# Patient Record
Sex: MALE | Race: WHITE | Employment: OTHER | URBAN - METROPOLITAN AREA
[De-identification: names, ages, dates, MRNs, and addresses within clinical notes are randomized per-mention and may not be internally consistent; named-entity substitution may affect disease eponyms.]

---

## 2022-07-01 ENCOUNTER — HOSPITAL ENCOUNTER (OUTPATIENT)
Age: 67
Setting detail: OBSERVATION
Discharge: HOME | End: 2022-07-02
Attending: EMERGENCY MEDICINE | Admitting: HOSPITALIST
Payer: MEDICARE

## 2022-07-01 ENCOUNTER — APPOINTMENT (OUTPATIENT)
Dept: CT IMAGING | Age: 67
End: 2022-07-01
Attending: EMERGENCY MEDICINE
Payer: MEDICARE

## 2022-07-01 ENCOUNTER — APPOINTMENT (OUTPATIENT)
Dept: GENERAL RADIOLOGY | Age: 67
End: 2022-07-01
Attending: EMERGENCY MEDICINE
Payer: MEDICARE

## 2022-07-01 DIAGNOSIS — I63.9 CEREBROVASCULAR ACCIDENT (CVA), UNSPECIFIED MECHANISM (HCC): Primary | ICD-10-CM

## 2022-07-01 DIAGNOSIS — N18.9 CHRONIC KIDNEY DISEASE, UNSPECIFIED CKD STAGE: ICD-10-CM

## 2022-07-01 PROBLEM — G45.9 TIA (TRANSIENT ISCHEMIC ATTACK): Status: ACTIVE | Noted: 2022-07-01

## 2022-07-01 LAB
ABO + RH BLD: NORMAL
ALBUMIN SERPL-MCNC: 3.7 G/DL (ref 3.5–5)
ALBUMIN/GLOB SERPL: 1.1 {RATIO} (ref 1.1–2.2)
ALP SERPL-CCNC: 164 U/L (ref 45–117)
ALT SERPL-CCNC: 54 U/L (ref 12–78)
ANION GAP SERPL CALC-SCNC: 12 MMOL/L (ref 5–15)
APTT PPP: 28.7 SEC (ref 21.2–34.1)
AST SERPL W P-5'-P-CCNC: 24 U/L (ref 15–37)
BASOPHILS # BLD: 0.1 K/UL (ref 0–0.1)
BASOPHILS NFR BLD: 1 % (ref 0–1)
BILIRUB SERPL-MCNC: 1 MG/DL (ref 0.2–1)
BLOOD GROUP ANTIBODIES SERPL: NEGATIVE
BNP SERPL-MCNC: ABNORMAL PG/ML
BUN SERPL-MCNC: 58 MG/DL (ref 6–20)
BUN/CREAT SERPL: 13 (ref 12–20)
CA-I BLD-MCNC: 8.9 MG/DL (ref 8.5–10.1)
CHLORIDE SERPL-SCNC: 110 MMOL/L (ref 97–108)
CO2 SERPL-SCNC: 15 MMOL/L (ref 21–32)
CREAT SERPL-MCNC: 4.37 MG/DL (ref 0.7–1.3)
DIFFERENTIAL METHOD BLD: ABNORMAL
EOSINOPHIL # BLD: 0.3 K/UL (ref 0–0.4)
EOSINOPHIL NFR BLD: 3 % (ref 0–7)
ERYTHROCYTE [DISTWIDTH] IN BLOOD BY AUTOMATED COUNT: 16.2 % (ref 11.5–14.5)
GLOBULIN SER CALC-MCNC: 3.4 G/DL (ref 2–4)
GLUCOSE SERPL-MCNC: 169 MG/DL (ref 65–100)
HCT VFR BLD AUTO: 33.6 % (ref 36.6–50.3)
HGB BLD-MCNC: 11.1 G/DL (ref 12.1–17)
IMM GRANULOCYTES # BLD AUTO: 0 K/UL (ref 0–0.04)
IMM GRANULOCYTES NFR BLD AUTO: 0 % (ref 0–0.5)
INR PPP: 1.2 (ref 0.9–1.1)
LYMPHOCYTES # BLD: 1.6 K/UL (ref 0.8–3.5)
LYMPHOCYTES NFR BLD: 17 % (ref 12–49)
MCH RBC QN AUTO: 28 PG (ref 26–34)
MCHC RBC AUTO-ENTMCNC: 33 G/DL (ref 30–36.5)
MCV RBC AUTO: 84.8 FL (ref 80–99)
MONOCYTES # BLD: 1.1 K/UL (ref 0–1)
MONOCYTES NFR BLD: 11 % (ref 5–13)
NEUTS SEG # BLD: 6.3 K/UL (ref 1.8–8)
NEUTS SEG NFR BLD: 68 % (ref 32–75)
NRBC # BLD: 0 K/UL (ref 0–0.01)
NRBC BLD-RTO: 0 PER 100 WBC
PLATELET # BLD AUTO: 225 K/UL (ref 150–400)
PMV BLD AUTO: 11.1 FL (ref 8.9–12.9)
POTASSIUM SERPL-SCNC: 4.1 MMOL/L (ref 3.5–5.1)
PROT SERPL-MCNC: 7.1 G/DL (ref 6.4–8.2)
PROTHROMBIN TIME: 15.4 SEC (ref 11.9–14.6)
RBC # BLD AUTO: 3.96 M/UL (ref 4.1–5.7)
SODIUM SERPL-SCNC: 137 MMOL/L (ref 136–145)
SPECIMEN EXP DATE BLD: NORMAL
THERAPEUTIC RANGE,PTTT: NORMAL SEC (ref 82–109)
TROPONIN-HIGH SENSITIVITY: 103 NG/L (ref 0–76)
WBC # BLD AUTO: 9.5 K/UL (ref 4.1–11.1)

## 2022-07-01 PROCEDURE — 85730 THROMBOPLASTIN TIME PARTIAL: CPT

## 2022-07-01 PROCEDURE — G0378 HOSPITAL OBSERVATION PER HR: HCPCS

## 2022-07-01 PROCEDURE — 85610 PROTHROMBIN TIME: CPT

## 2022-07-01 PROCEDURE — 84484 ASSAY OF TROPONIN QUANT: CPT

## 2022-07-01 PROCEDURE — 80053 COMPREHEN METABOLIC PANEL: CPT

## 2022-07-01 PROCEDURE — 36415 COLL VENOUS BLD VENIPUNCTURE: CPT

## 2022-07-01 PROCEDURE — 71045 X-RAY EXAM CHEST 1 VIEW: CPT

## 2022-07-01 PROCEDURE — 74011250637 HC RX REV CODE- 250/637: Performed by: EMERGENCY MEDICINE

## 2022-07-01 PROCEDURE — 85025 COMPLETE CBC W/AUTO DIFF WBC: CPT

## 2022-07-01 PROCEDURE — 93005 ELECTROCARDIOGRAM TRACING: CPT

## 2022-07-01 PROCEDURE — 70496 CT ANGIOGRAPHY HEAD: CPT

## 2022-07-01 PROCEDURE — 83880 ASSAY OF NATRIURETIC PEPTIDE: CPT

## 2022-07-01 PROCEDURE — 74011250637 HC RX REV CODE- 250/637: Performed by: FAMILY MEDICINE

## 2022-07-01 PROCEDURE — 83970 ASSAY OF PARATHORMONE: CPT

## 2022-07-01 PROCEDURE — 86900 BLOOD TYPING SEROLOGIC ABO: CPT

## 2022-07-01 PROCEDURE — 74011250637 HC RX REV CODE- 250/637: Performed by: HOSPITALIST

## 2022-07-01 PROCEDURE — 74011250636 HC RX REV CODE- 250/636: Performed by: EMERGENCY MEDICINE

## 2022-07-01 PROCEDURE — 74011000636 HC RX REV CODE- 636: Performed by: EMERGENCY MEDICINE

## 2022-07-01 PROCEDURE — 99285 EMERGENCY DEPT VISIT HI MDM: CPT

## 2022-07-01 PROCEDURE — 70450 CT HEAD/BRAIN W/O DYE: CPT

## 2022-07-01 RX ORDER — HYDRALAZINE HYDROCHLORIDE 50 MG/1
50 TABLET, FILM COATED ORAL 3 TIMES DAILY
COMMUNITY

## 2022-07-01 RX ORDER — FLUTICASONE PROPIONATE 50 MCG
2 SPRAY, SUSPENSION (ML) NASAL
COMMUNITY
Start: 2022-04-03

## 2022-07-01 RX ORDER — ATORVASTATIN CALCIUM 10 MG/1
20 TABLET, FILM COATED ORAL
Status: DISCONTINUED | OUTPATIENT
Start: 2022-07-01 | End: 2022-07-02

## 2022-07-01 RX ORDER — ALLOPURINOL 100 MG/1
200 TABLET ORAL DAILY
Status: DISCONTINUED | OUTPATIENT
Start: 2022-07-01 | End: 2022-07-02 | Stop reason: HOSPADM

## 2022-07-01 RX ORDER — ASPIRIN 325 MG
325 TABLET ORAL
Status: COMPLETED | OUTPATIENT
Start: 2022-07-01 | End: 2022-07-01

## 2022-07-01 RX ORDER — SODIUM BICARBONATE 650 MG/1
650 TABLET ORAL 3 TIMES DAILY
Status: DISCONTINUED | OUTPATIENT
Start: 2022-07-01 | End: 2022-07-02 | Stop reason: HOSPADM

## 2022-07-01 RX ORDER — AMLODIPINE BESYLATE 5 MG/1
10 TABLET ORAL DAILY
Status: DISCONTINUED | OUTPATIENT
Start: 2022-07-02 | End: 2022-07-02 | Stop reason: HOSPADM

## 2022-07-01 RX ORDER — CALCITRIOL 0.25 UG/1
0.25 CAPSULE ORAL DAILY
Status: DISCONTINUED | OUTPATIENT
Start: 2022-07-02 | End: 2022-07-02 | Stop reason: HOSPADM

## 2022-07-01 RX ORDER — FLUTICASONE PROPIONATE 50 MCG
2 SPRAY, SUSPENSION (ML) NASAL DAILY
Status: DISCONTINUED | OUTPATIENT
Start: 2022-07-02 | End: 2022-07-02 | Stop reason: HOSPADM

## 2022-07-01 RX ORDER — AMLODIPINE BESYLATE 10 MG/1
5 TABLET ORAL DAILY
COMMUNITY
Start: 2022-04-22

## 2022-07-01 RX ORDER — ACETAMINOPHEN 650 MG/1
650 SUPPOSITORY RECTAL
Status: DISCONTINUED | OUTPATIENT
Start: 2022-07-01 | End: 2022-07-02 | Stop reason: HOSPADM

## 2022-07-01 RX ORDER — ALLOPURINOL 300 MG/1
300 TABLET ORAL EVERY EVENING
Status: DISCONTINUED | OUTPATIENT
Start: 2022-07-01 | End: 2022-07-01 | Stop reason: DRUGHIGH

## 2022-07-01 RX ORDER — ACETAMINOPHEN 325 MG/1
650 TABLET ORAL
Status: DISCONTINUED | OUTPATIENT
Start: 2022-07-01 | End: 2022-07-02 | Stop reason: HOSPADM

## 2022-07-01 RX ORDER — ONDANSETRON 2 MG/ML
4 INJECTION INTRAMUSCULAR; INTRAVENOUS
Status: DISCONTINUED | OUTPATIENT
Start: 2022-07-01 | End: 2022-07-01

## 2022-07-01 RX ORDER — METOPROLOL SUCCINATE 100 MG/1
100 TABLET, EXTENDED RELEASE ORAL DAILY
COMMUNITY
Start: 2022-06-01

## 2022-07-01 RX ORDER — ALBUTEROL SULFATE 0.83 MG/ML
2.5 SOLUTION RESPIRATORY (INHALATION)
Status: DISCONTINUED | OUTPATIENT
Start: 2022-07-01 | End: 2022-07-02 | Stop reason: HOSPADM

## 2022-07-01 RX ORDER — CALCITRIOL 0.25 UG/1
0.25 CAPSULE ORAL DAILY
COMMUNITY
Start: 2022-06-01

## 2022-07-01 RX ORDER — ALBUTEROL SULFATE 0.83 MG/ML
2.5 SOLUTION RESPIRATORY (INHALATION)
COMMUNITY

## 2022-07-01 RX ORDER — METOPROLOL SUCCINATE 25 MG/1
100 TABLET, EXTENDED RELEASE ORAL DAILY
Status: DISCONTINUED | OUTPATIENT
Start: 2022-07-02 | End: 2022-07-02 | Stop reason: HOSPADM

## 2022-07-01 RX ORDER — GUAIFENESIN 100 MG/5ML
81 LIQUID (ML) ORAL DAILY
Status: DISCONTINUED | OUTPATIENT
Start: 2022-07-02 | End: 2022-07-02

## 2022-07-01 RX ORDER — SODIUM BICARBONATE 650 MG/1
650 TABLET ORAL 2 TIMES DAILY
COMMUNITY
Start: 2022-05-05

## 2022-07-01 RX ADMIN — ATORVASTATIN CALCIUM 20 MG: 10 TABLET, FILM COATED ORAL at 22:23

## 2022-07-01 RX ADMIN — ALLOPURINOL 200 MG: 100 TABLET ORAL at 17:33

## 2022-07-01 RX ADMIN — IOPAMIDOL 100 ML: 755 INJECTION, SOLUTION INTRAVENOUS at 14:57

## 2022-07-01 RX ADMIN — SODIUM BICARBONATE 650 MG: 650 TABLET ORAL at 22:23

## 2022-07-01 RX ADMIN — ASPIRIN 325 MG ORAL TABLET 325 MG: 325 PILL ORAL at 17:33

## 2022-07-01 RX ADMIN — SODIUM CHLORIDE 1000 ML: 9 INJECTION, SOLUTION INTRAVENOUS at 17:33

## 2022-07-01 NOTE — ED PROVIDER NOTES
EMERGENCY DEPARTMENT HISTORY AND PHYSICAL EXAM      Date: 7/1/2022  Patient Name: Salazar Cancino      History of Presenting Illness     Chief Complaint   Patient presents with    Stroke       History Provided By: Patient and EMS    HPI: Amadeo Smith, 81-year-old male with history of CKD on renal transplant list presenting with left-sided weakness. Patient felt weak and fell while at the [employer] about 45 minutes ago. He has speech difficulty and reported some left-sided weakness and numbness in route. He states symptoms initially started with numbness in his right lower extremity. His symptoms have improved in route. Patient is also complaining of muscle spasms. There are no other complaints, changes, or physical findings at this time. PCP: Marce Mckeon MD    Current Facility-Administered Medications   Medication Dose Route Frequency Provider Last Rate Last Admin    sodium chloride 0.9 % bolus infusion 1,000 mL  1,000 mL IntraVENous ONCE Maryjane Awan MD        aspirin tablet 325 mg  325 mg Oral NOW Maryjane Awan MD         Current Outpatient Medications   Medication Sig Dispense Refill    albuterol (PROVENTIL VENTOLIN) 2.5 mg /3 mL (0.083 %) nebu 2.5 mg by Nebulization route every six (6) hours as needed for Wheezing.  docusate sodium (COLACE) 100 mg capsule Take 1 capsule by mouth two (2) times a day. 60 capsule 2    neomycin-polymyxin-dexamethasone (DEXACINE) 3.5-10,000-0.1 mg-unit/g-% ophthalmic ointment Administer to right eye 3 times daily until symptoms resolve. 1 Tube 0    oxybutynin (DITROPAN) 5 mg tablet Take 1 tablet by mouth every eight (8) hours as needed for Other (Bladder Spasm). 30 tablet 1    hydrochlorothiazide (HYDRODIURIL) 25 mg tablet Take 25 mg by mouth daily. Indications: HYPERTENSION      allopurinol (ZYLOPRIM) 300 mg tablet Take 300 mg by mouth every evening.  metoprolol (LOPRESSOR) 100 mg tablet Take 100 mg by mouth every evening.       simvastatin (ZOCOR) 40 mg tablet Take 40 mg by mouth nightly.  omega-3 fatty acids-vitamin e (FISH OIL) 1,000 mg cap Take 1 capsule by mouth every evening. Past History   Past Medical History:  Past Medical History:   Diagnosis Date    Cancer ([address]     prostate cancer    Hypertension     Ill-defined condition     gout    Nausea & vomiting     after prostate biopsy    Other ill-defined conditions(799.89)     high cholesterol    Unspecified sleep apnea 2008    could not tolerate C-PAP       Past Surgical History:  Past Surgical History:   Procedure Laterality Date    HX APPENDECTOMY      as a child    HX CHOLECYSTECTOMY  2004    HX OTHER SURGICAL      benign tumor left shoulder age 18    HX TONSILLECTOMY      as a child       Family History:  Family History   Problem Relation Age of Onset   Dimple Me Hypertension Mother     Heart Disease Mother     OSTEOARTHRITIS Mother     Cancer Brother         leukemia    Heart Disease Sister        Social History:  Social History     Tobacco Use    Smoking status: Never Smoker    Smokeless tobacco: Never Used   Substance Use Topics    Alcohol use: Yes     Alcohol/week: 1.7 standard drinks     Types: 2 Cans of beer per week    Drug use: No       Allergies:  No Known Allergies  Review of Systems   Review of Systems   Constitutional: Negative for chills and fever. HENT: Negative for sore throat. Eyes: Negative for redness. Respiratory: Negative for shortness of breath. Cardiovascular: Negative for chest pain. Gastrointestinal: Negative for abdominal pain, nausea and vomiting. Genitourinary: Negative for flank pain. Musculoskeletal: Negative for myalgias. Skin: Negative for rash. Neurological: Positive for weakness and numbness. Negative for headaches. Physical Exam   Physical Exam  Vitals and nursing note reviewed. Constitutional:       General: He is not in acute distress. Appearance: Normal appearance.    HENT:      Head: Normocephalic and atraumatic. Mouth/Throat:      Mouth: Mucous membranes are moist.   Eyes:      Extraocular Movements: Extraocular movements intact. Conjunctiva/sclera: Conjunctivae normal.   Cardiovascular:      Rate and Rhythm: Normal rate and regular rhythm. Pulmonary:      Effort: Pulmonary effort is normal. No respiratory distress. Breath sounds: Normal breath sounds. No wheezing, rhonchi or rales. Abdominal:      General: There is no distension. Palpations: Abdomen is soft. Tenderness: There is no abdominal tenderness. Musculoskeletal:         General: Normal range of motion. Cervical back: Normal range of motion. Skin:     General: Skin is warm and dry. Neurological:      General: No focal deficit present. Mental Status: He is alert and oriented to person, place, and time. Mental status is at baseline. Comments: No drift to left upper extremity. Able to flex and extend at the ankle which is unable to do in route but unable to lift the left leg. No facial droop, aphasia or dysarthria. Lab and Diagnostic Study Results   Labs -     Recent Results (from the past 12 hour(s))   METABOLIC PANEL, COMPREHENSIVE    Collection Time: 07/01/22  2:31 PM   Result Value Ref Range    Sodium 137 136 - 145 mmol/L    Potassium 4.1 3.5 - 5.1 mmol/L    Chloride 110 (H) 97 - 108 mmol/L    CO2 15 (LL) 21 - 32 mmol/L    Anion gap 12 5 - 15 mmol/L    Glucose 169 (H) 65 - 100 mg/dL    BUN 58 (H) 6 - 20 mg/dL    Creatinine 4.37 (H) 0.70 - 1.30 mg/dL    BUN/Creatinine ratio 13 12 - 20      GFR est AA 17 (L) >60 ml/min/1.73m2    GFR est non-AA 14 (L) >60 ml/min/1.73m2    Calcium 8.9 8.5 - 10.1 mg/dL    Bilirubin, total 1.0 0.2 - 1.0 mg/dL    AST (SGOT) 24 15 - 37 U/L    ALT (SGPT) 54 12 - 78 U/L    Alk.  phosphatase 164 (H) 45 - 117 U/L    Protein, total 7.1 6.4 - 8.2 g/dL    Albumin 3.7 3.5 - 5.0 g/dL    Globulin 3.4 2.0 - 4.0 g/dL    A-G Ratio 1.1 1.1 - 2.2     CBC WITH AUTOMATED DIFF    Collection Time: 07/01/22  2:31 PM   Result Value Ref Range    WBC 9.5 4.1 - 11.1 K/uL    RBC 3.96 (L) 4.10 - 5.70 M/uL    HGB 11.1 (L) 12.1 - 17.0 g/dL    HCT 33.6 (L) 36.6 - 50.3 %    MCV 84.8 80.0 - 99.0 FL    MCH 28.0 26.0 - 34.0 PG    MCHC 33.0 30.0 - 36.5 g/dL    RDW 16.2 (H) 11.5 - 14.5 %    PLATELET 680 431 - 421 K/uL    MPV 11.1 8.9 - 12.9 FL    NRBC 0.0 0.0  WBC    ABSOLUTE NRBC 0.00 0.00 - 0.01 K/uL    NEUTROPHILS 68 32 - 75 %    LYMPHOCYTES 17 12 - 49 %    MONOCYTES 11 5 - 13 %    EOSINOPHILS 3 0 - 7 %    BASOPHILS 1 0 - 1 %    IMMATURE GRANULOCYTES 0 0 - 0.5 %    ABS. NEUTROPHILS 6.3 1.8 - 8.0 K/UL    ABS. LYMPHOCYTES 1.6 0.8 - 3.5 K/UL    ABS. MONOCYTES 1.1 (H) 0.0 - 1.0 K/UL    ABS. EOSINOPHILS 0.3 0.0 - 0.4 K/UL    ABS. BASOPHILS 0.1 0.0 - 0.1 K/UL    ABS. IMM. GRANS. 0.0 0.00 - 0.04 K/UL    DF AUTOMATED     TROPONIN-HIGH SENSITIVITY    Collection Time: 07/01/22  2:31 PM   Result Value Ref Range    Troponin-High Sensitivity 103 (H) 0 - 76 ng/L   NT-PRO BNP    Collection Time: 07/01/22  2:31 PM   Result Value Ref Range    NT pro-BNP 12,603 (H) <125 pg/mL   PROTHROMBIN TIME + INR    Collection Time: 07/01/22  2:31 PM   Result Value Ref Range    Prothrombin time 15.4 (H) 11.9 - 14.6 sec    INR 1.2 (H) 0.9 - 1.1     PTT    Collection Time: 07/01/22  2:31 PM   Result Value Ref Range    aPTT 28.7 21.2 - 34.1 sec    aPTT, therapeutic range   82 - 109 sec       Radiologic Studies -   [unfilled]  CT Results  (Last 48 hours)               07/01/22 1453  CTA CODE NEURO HEAD AND NECK W CONT Final result    Impression:  CTA Head:   Evaluation in the region of the [place] is severely limited by motion. 1. Occlusion of the distal right V4 segment, with nondominant right vertebral   artery. 2. Otherwise no evidence of flow-limiting stenosis or aneurysm. CTA Neck:   1. Severe stenosis at the origin of the nondominant right vertebral artery.    2. Approximately 55% stenosis of the proximal right internal carotid artery by   NASCET criteria. 3. Mild stenosis (less than 50% by NASCET criteria) of the proximal left   internal carotid artery. 4. Additional atherosclerotic disease as above. Narrative:  EXAM:  CTA CODE NEURO HEAD AND NECK W CONT       INDICATION:   CVA       COMPARISON:  CT head 7/1/2022. CONTRAST:  100 mL of Isovue-370. TECHNIQUE:  Unenhanced  images were obtained to localize the volume for   acquisition. Multislice helical axial CT angiography was performed from the   aortic arch to the top of the head during uneventful rapid bolus intravenous   contrast administration. Coronal and sagittal reformations and 3D post   processing was performed. CT dose reduction was achieved through use of a   standardized protocol tailored for this examination and automatic exposure   control for dose modulation. This study was analyzed by the [address]  algorithm. FINDINGS:       CTA Head:   Evaluation in the region of the [place] is severely limited by motion. There is no evidence of large vessel occlusion or flow-limiting stenosis of the   intracranial internal carotid, anterior cerebral, and middle cerebral arteries. The anterior communicating artery is patent, with hypoplastic left A1 segment. Occlusion of the distal right V4 segment. There is otherwise no evidence of   large vessel occlusion or flow-limiting stenosis in the posterior circulation. The posterior communicating arteries are patent, right larger than left. There is no evidence of aneurysm or vascular malformation. The dural venous   sinuses and deep cerebral venous system are patent. CTA NECK:   NASCET method was utilized for calculating stenosis. The aortic arch is unremarkable. There is long segment mild stenosis of the   right common carotid artery from eccentric noncalcified plaque.  There is no   significant stenosis of the left common carotid artery. Calcification of the   proximal right internal carotid artery with approximately 55% stenosis of the   proximal right internal carotid artery. Calcification of the proximal left   internal carotid artery with mild (less than 50%) stenosis. There is a left dominant vertebrobasilar arterial system. The left vertebral   artery arises directly from the aortic arch. Severe stenosis at the origin of   the right vertebral artery. No significant stenosis of the cervical left   vertebral artery. Visualized soft tissues of the neck are unremarkable. Trace bilateral pleural   effusions. No acute fracture or aggressive osseous lesion. Multilevel   degenerative disc disease in the cervical spine most advanced at C4-C5 and   C5-C6.           07/01/22 1446  CT CODE NEURO HEAD WO CONTRAST Final result    Impression:  No acute intracranial abnormality. Narrative:  INDICATION:  CVA       EXAM: CT HEAD WITHOUT CONTRAST. COMPARISON: None. PROCEDURE: Sequential axial images of the head were performed without   intravenous contrast. Soft tissue and bone windows were examined. Images were   reformatted in the sagittal and coronal planes. .CT dose reduction was achieved   through use of a standardized protocol tailored for this examination and   automatic exposure control for dose modulation. FINDINGS: The brain parenchyma and ventricular system are unremarkable in   appearance for age. There is no parenchymal mass or hemorrhage and no shift of   midline structures or extra-axial collection. No obvious acute ischemia. No bony   abnormality. Incidentally noted prosthetic right globe. Mucoperiosteal   thickening incidentally noted in the right maxillary sinus. CXR Results  (Last 48 hours)               07/01/22 1455  XR CHEST PORT Final result    Impression:  Interstitial pattern which is new.:       Narrative:  Clinical indication: CVA.        Portable AP supine portable view of the chest obtained, comparison October 2014. There is a mild diffuse interstitial edema pattern worse on the left perihilar   region. Nonspecific. The  heart size is normal. No consolidation. Medical Decision Making and ED Course   - I am the first and primary provider for this patient AND AM THE PRIMARY PROVIDER OF RECORD. I reviewed the vital signs, available nursing notes, past medical history, past surgical history, family history and social history. - Initial assessment performed. The patients presenting problems have been discussed, and the staff are in agreement with the care plan formulated and outlined with them. I have encouraged them to ask questions as they arise throughout their visit. Differential Diagnosis & Medical Decision Making Provider Note:   79-year-old male with history of CKD on renal transplant list presenting with numbness and weakness. His symptoms were transient, atypical  Rapidly improving. NIH 3 on arrival which quickly improved to check an NIH of 1 for altered sensorium. Not a candidate for tPA for this reason. There was an occlusion of the distal V4 segment on the CTA but no otherwise no LVO. Patient was started on full dose aspirin and admitted to medicine. Given bolus of IV fluid as he received contrast dose for his emergent stroke evaluation  MDM     Vital Signs-Reviewed the patient's vital signs. Patient Vitals for the past 12 hrs:   Temp Pulse Resp BP SpO2   07/01/22 1510 -- -- 16 124/80 --   07/01/22 1430 97.5 °F (36.4 °C) 75 -- -- 96 %       EKG interpretation: (Preliminary): Performed at [phone]. EKG Interpreted by me. Shows SR rate 69, normal axis, normal intervals, no ST elevation or depression, T wave inversions in V2 through V5. QTc 484    ED Course:   ED Course as of 07/01/22 1551   Fri Jul 01, 2022   1502 Patient is now moving his left lower extremity completely and freely.  He is now rpeorting numbness in his right lower extremity and states when he touches himself it feels like someone else is touching him [KK]   1520 Troponin-High Sensitivity(!): 103 [KK]   1520 CO2(!!): 15 [KK]   1543 Spoke with neurology on call. Discussed case. TPA not recommended given mild and improving symptoms. Occlusion is not amenable to intervention. Will start aspirin, admit to medicine [KK]      ED Course User Index  [KK] Allison Combs MD       Procedures and Critical Care     Performed by: Charlie Wiggins MD      CRITICAL CARE NOTE :  2:35 PM  Amount of Critical Care Time: 35minutes    IMPENDING DETERIORATION -CNS  ASSOCIATED RISK FACTORS - CNS Decompensation  MANAGEMENT- Bedside Assessment  INTERPRETATION -  CT Scan and ECG  INTERVENTIONS - hemodynamic mngmt and Neurologic interventions   CASE REVIEW - Hospitalist/Intensivist and Medical Sub-Specialist  TREATMENT RESPONSE -Stable  PERFORMED BY - Self    NOTES   :  I have spent the above critical care time involved in lab review, consultations with specialist, family decision- making, bedside attention and documentation. This time excludes time spent in any separate billed procedures. During this entire length of time I was immediately available to the patient . Charlie Wiggins MD    Disposition   Disposition: Admitted to Floor Medical Floor the case was discussed with the admitting physician Dr Yovanny Kramer        Diagnosis/Clinical Impression     Clinical Impression:   1. Cerebrovascular accident (CVA), unspecified mechanism ([address]    2. Chronic kidney disease, unspecified CKD stage        Attestations:  Charlie Wiggins MD    Please note that this dictation was completed with [employer]id[employer], the computer voice recognition software. Quite often unanticipated grammatical, syntax, homophones, and other interpretive errors are inadvertently transcribed by the computer software. Please disregard these errors. Please excuse any errors that have escaped final proofreading.   Thank you.

## 2022-07-01 NOTE — PROGRESS NOTES
Admission Medication Reconciliation:    Information obtained from:  Patient    Comments/Recommendations: Reviewed PTA medications and patient's allergies. Allergies:  Patient has no known allergies. Significant PMH/Disease States:   Past Medical History:   Diagnosis Date    Cancer [facility])     prostate cancer    Hypertension     Ill-defined condition     gout    Nausea & vomiting     after prostate biopsy    Other ill-defined conditions(799.89)     high cholesterol    Unspecified sleep apnea     could not tolerate C-PAP     Chief Complaint for this Admission:    Chief Complaint   Patient presents with    Stroke     Prior to Admission Medications:   Prior to Admission Medications   Prescriptions Last Dose Informant Patient Reported? Taking? albuterol (PROVENTIL VENTOLIN) 2.5 mg /3 mL (0.083 %) nebu  Self Yes Yes   Si.5 mg by Nebulization route every six (6) hours as needed for Wheezing. allopurinol (ZYLOPRIM) 300 mg tablet 2022 at Unknown time Self Yes Yes   Sig: Take 300 mg by mouth every evening. amLODIPine (NORVASC) 10 mg tablet  Self Yes Yes   Sig: Take 10 mg by mouth daily. calcitRIOL (ROCALTROL) 0.25 mcg capsule  Self Yes Yes   Sig: Take 0.25 mcg by mouth daily. fluticasone propionate (FLONASE) 50 mcg/actuation nasal spray  Self Yes Yes   Si Sprays by Both Nostrils route daily as needed. metoprolol succinate (TOPROL-XL) 100 mg tablet  Self Yes Yes   Sig: Take 100 mg by mouth daily. simvastatin (ZOCOR) 40 mg tablet 2022 at Unknown time Self Yes Yes   Sig: Take 40 mg by mouth nightly.   sodium bicarbonate 650 mg tablet  Self Yes Yes   Sig: Take 650 mg by mouth two (2) times a day.       Facility-Administered Medications: None       Michelle Patino

## 2022-07-01 NOTE — H&P
History and Physical              Subjective :   Chief Complaint : Generalized weakness with fall, numbness, feels like his feeling is not good in all extremities. Source of information : Patient, ED provider. History of present illness:   61 y.o. male with history of hypertension, gouty arthritis, chronic kidney disease on transplant list and regular follow-up with [facility] nephrology clinic  presents to the emergency room after a fall. Patient states that he felt so weak and fell on the floor, did not lose any consciousness. Denies any chest pain, palpitations, nausea or vomiting. Denies any headache. He cannot explain but overall in general he feels very weak. States when he is touching himself feels like somebody else is touching him, feels like fine sensation is lost.  He cannot make a difference in the sharp pain and a touch of soft thing. It is mostly on his right foot today, which extended into the leg. Past Medical History:   Diagnosis Date    Cancer [facility])     prostate cancer    Hypertension     Ill-defined condition     gout    Nausea & vomiting     after prostate biopsy    Other ill-defined conditions(799.89)     high cholesterol    Unspecified sleep apnea 2008    could not tolerate C-PAP     Past Surgical History:   Procedure Laterality Date    HX APPENDECTOMY      as a child    HX CHOLECYSTECTOMY  2004    HX OTHER SURGICAL      benign tumor left shoulder age 18    HX TONSILLECTOMY      as a child     Family History   Problem Relation Age of Onset    Hypertension Mother     Heart Disease Mother     OSTEOARTHRITIS Mother     Cancer Brother         leukemia    Heart Disease Sister       Social History     Tobacco Use    Smoking status: Never Smoker    Smokeless tobacco: Never Used   Substance Use Topics    Alcohol use: Yes     Alcohol/week: 1.7 standard drinks     Types: 2 Cans of beer per week       Prior to Admission medications    Medication Sig Start Date End Date Taking? Authorizing Provider   albuterol (PROVENTIL VENTOLIN) 2.5 mg /3 mL (0.083 %) nebu 2.5 mg by Nebulization route every six (6) hours as needed for Wheezing. Other, MD Tee   amLODIPine (NORVASC) 10 mg tablet Take 10 mg by mouth daily. 4/22/22   Provider, Historical   fluticasone propionate (FLONASE) 50 mcg/actuation nasal spray 2 Sprays by Both Nostrils route daily. 4/3/22   Provider, Historical   calcitRIOL (ROCALTROL) 0.25 mcg capsule Take 0.25 mcg by mouth daily. 6/1/22   Provider, Historical   metoprolol succinate (TOPROL-XL) 100 mg tablet Take 100 mg by mouth daily. 6/1/22   Provider, Historical   sodium bicarbonate 650 mg tablet Take 650 mg by mouth three (3) times daily. 5/5/22   Provider, Historical   allopurinol (ZYLOPRIM) 300 mg tablet Take 300 mg by mouth every evening. Provider, Historical   simvastatin (ZOCOR) 40 mg tablet Take 40 mg by mouth nightly. Provider, Historical       Allergies: No known medication allergies. Review of Systems:  Constitutional: Appetite is fair. Denies weight loss, no fever, no chills, no night sweats. Complains of feeling fatigued. Eye: No recent visual disturbances, no discharge, no double vision. Ear/nose/mouth/throat : No hearing disturbance, no ear pain, no nasal congestion, denies any postnasal drainage or sneezing. No sore throat, no trouble swallowing. Respiratory : No trouble breathing, no cough, no shortness of breath,   Cardiovascular : No chest pain, no palpitation,  no orthopnea,  no peripheral edema. Gastrointestinal : No nausea, no vomiting,  No abdominal pain. Genitourinary : No dysuria, no hematuria. Admits hesitancy and weak stream.  Feels incomplete emptying, admits to nocturia. Has a history of carcinoma prostate, surgery done October 2014. Eliza Corbett Lymphatics : No swollen glands -Neck, axillary, inguinal.  Endocrine : No excessive thirst, No polyuria   Immunologic :  No seasonal allergies.    Musculoskeletal : No joint swelling, No pain, No effusion,  No back pain, No neck pain. Integumentary : No rash, No pruritus,   Hematology : No petechiae, No easy bruising,  No tendency to bleed easy. Neurology : As in history of present illness. Denies change in mental status, No abnormal balance, No headache, No confusion,   Psychiatric : No mood swings, No anxiety, No depression. Vitals:     Patient Vitals for the past 12 hrs:   Temp Pulse Resp BP SpO2   07/01/22 1510 -- -- 16 124/80 --   07/01/22 1430 97.5 °F (36.4 °C) 75 -- -- 96 %       Physical Exam:   General : Thin built male, appears very tired and weak, little anxious. HEENT : PERRLA, dry oral mucosa, atraumatic normocephalic, Normal ear and nose. Neck : Supple, no JVD, no masses noted, no carotid bruit. Lungs : Breath sounds with moderate air entry bilaterally, no wheezes or rales, no accessory muscle use. CVS : Rhythm rate regular, S1+, S2+, no murmur or gallop. Abdomen : Soft, nontender, no organomegaly, bowel sounds active. Extremities : No edema noted,  pedal pulses palpable. Musculoskeletal : Fair range of motion, no joint swelling or effusion, muscle tone and power appears slightly decreased. Skin : Moist, warm. No pathological rash. Lymphatic : No cervical lymphadenopathy. Neurological : Awake, alert, oriented to time place person. No focal motor deficits noted. Cranial nerves intact, deep tendon reflexes active, no sensory disturbance, no cerebellar deficits. Psychiatric : Mood and affect appears little anxious.       Data Review:   Recent Results (from the past 24 hour(s))   METABOLIC PANEL, COMPREHENSIVE    Collection Time: 07/01/22  2:31 PM   Result Value Ref Range    Sodium 137 136 - 145 mmol/L    Potassium 4.1 3.5 - 5.1 mmol/L    Chloride 110 (H) 97 - 108 mmol/L    CO2 15 (LL) 21 - 32 mmol/L    Anion gap 12 5 - 15 mmol/L    Glucose 169 (H) 65 - 100 mg/dL    BUN 58 (H) 6 - 20 mg/dL    Creatinine 4.37 (H) 0.70 - 1.30 mg/dL    BUN/Creatinine ratio 13 12 - 20      GFR est AA 17 (L) >60 ml/min/1.73m2    GFR est non-AA 14 (L) >60 ml/min/1.73m2    Calcium 8.9 8.5 - 10.1 mg/dL    Bilirubin, total 1.0 0.2 - 1.0 mg/dL    AST (SGOT) 24 15 - 37 U/L    ALT (SGPT) 54 12 - 78 U/L    Alk. phosphatase 164 (H) 45 - 117 U/L    Protein, total 7.1 6.4 - 8.2 g/dL    Albumin 3.7 3.5 - 5.0 g/dL    Globulin 3.4 2.0 - 4.0 g/dL    A-G Ratio 1.1 1.1 - 2.2     CBC WITH AUTOMATED DIFF    Collection Time: 07/01/22  2:31 PM   Result Value Ref Range    WBC 9.5 4.1 - 11.1 K/uL    RBC 3.96 (L) 4.10 - 5.70 M/uL    HGB 11.1 (L) 12.1 - 17.0 g/dL    HCT 33.6 (L) 36.6 - 50.3 %    MCV 84.8 80.0 - 99.0 FL    MCH 28.0 26.0 - 34.0 PG    MCHC 33.0 30.0 - 36.5 g/dL    RDW 16.2 (H) 11.5 - 14.5 %    PLATELET 345 599 - 887 K/uL    MPV 11.1 8.9 - 12.9 FL    NRBC 0.0 0.0  WBC    ABSOLUTE NRBC 0.00 0.00 - 0.01 K/uL    NEUTROPHILS 68 32 - 75 %    LYMPHOCYTES 17 12 - 49 %    MONOCYTES 11 5 - 13 %    EOSINOPHILS 3 0 - 7 %    BASOPHILS 1 0 - 1 %    IMMATURE GRANULOCYTES 0 0 - 0.5 %    ABS. NEUTROPHILS 6.3 1.8 - 8.0 K/UL    ABS. LYMPHOCYTES 1.6 0.8 - 3.5 K/UL    ABS. MONOCYTES 1.1 (H) 0.0 - 1.0 K/UL    ABS. EOSINOPHILS 0.3 0.0 - 0.4 K/UL    ABS. BASOPHILS 0.1 0.0 - 0.1 K/UL    ABS. IMM.  GRANS. 0.0 0.00 - 0.04 K/UL    DF AUTOMATED     TROPONIN-HIGH SENSITIVITY    Collection Time: 07/01/22  2:31 PM   Result Value Ref Range    Troponin-High Sensitivity 103 (H) 0 - 76 ng/L   NT-PRO BNP    Collection Time: 07/01/22  2:31 PM   Result Value Ref Range    NT pro-BNP 12,603 (H) <125 pg/mL   TYPE & SCREEN    Collection Time: 07/01/22  2:31 PM   Result Value Ref Range    Crossmatch Expiration 07/04/2022,2359     ABO/Rh(D) Essie Doyne Positive     Antibody screen Negative    PROTHROMBIN TIME + INR    Collection Time: 07/01/22  2:31 PM   Result Value Ref Range    Prothrombin time 15.4 (H) 11.9 - 14.6 sec    INR 1.2 (H) 0.9 - 1.1     PTT    Collection Time: 07/01/22  2:31 PM   Result Value Ref Range    aPTT 28.7 21.2 - 34.1 sec    aPTT, therapeutic range   82 - 109 sec       Radiologic Studies :   CT Results  (Last 48 hours)               07/01/22 1457  CTA CODE NEURO HEAD AND NECK W CONT Final result    Impression:  CTA Head:   Evaluation in the region of the [place] is severely limited by motion. 1. Occlusion of the distal right V4 segment, with nondominant right vertebral   artery. 2. Otherwise no evidence of flow-limiting stenosis or aneurysm. CTA Neck:   1. Severe stenosis at the origin of the nondominant right vertebral artery. 2. Approximately 55% stenosis of the proximal right internal carotid artery by   NASCET criteria. 3. Mild stenosis (less than 50% by NASCET criteria) of the proximal left   internal carotid artery. 4. Additional atherosclerotic disease as above. Narrative:  EXAM:  CTA CODE NEURO HEAD AND NECK W CONT       INDICATION:   CVA       COMPARISON:  CT head 7/1/2022. CONTRAST:  100 mL of Isovue-370. TECHNIQUE:  Unenhanced  images were obtained to localize the volume for   acquisition. Multislice helical axial CT angiography was performed from the   aortic arch to the top of the head during uneventful rapid bolus intravenous   contrast administration. Coronal and sagittal reformations and 3D post   processing was performed. CT dose reduction was achieved through use of a   standardized protocol tailored for this examination and automatic exposure   control for dose modulation. This study was analyzed by the [address]  algorithm. FINDINGS:       CTA Head:   Evaluation in the region of the [place] is severely limited by motion. There is no evidence of large vessel occlusion or flow-limiting stenosis of the   intracranial internal carotid, anterior cerebral, and middle cerebral arteries. The anterior communicating artery is patent, with hypoplastic left A1 segment. Occlusion of the distal right V4 segment.  There is otherwise no evidence of   large vessel occlusion or flow-limiting stenosis in the posterior circulation. The posterior communicating arteries are patent, right larger than left. There is no evidence of aneurysm or vascular malformation. The dural venous   sinuses and deep cerebral venous system are patent. CTA NECK:   NASCET method was utilized for calculating stenosis. The aortic arch is unremarkable. There is long segment mild stenosis of the   right common carotid artery from eccentric noncalcified plaque. There is no   significant stenosis of the left common carotid artery. Calcification of the   proximal right internal carotid artery with approximately 55% stenosis of the   proximal right internal carotid artery. Calcification of the proximal left   internal carotid artery with mild (less than 50%) stenosis. There is a left dominant vertebrobasilar arterial system. The left vertebral   artery arises directly from the aortic arch. Severe stenosis at the origin of   the right vertebral artery. No significant stenosis of the cervical left   vertebral artery. Visualized soft tissues of the neck are unremarkable. Trace bilateral pleural   effusions. No acute fracture or aggressive osseous lesion. Multilevel   degenerative disc disease in the cervical spine most advanced at C4-C5 and   C5-C6.           07/01/22 1446  CT CODE NEURO HEAD WO CONTRAST Final result    Impression:  No acute intracranial abnormality. Narrative:  INDICATION:  CVA       EXAM: CT HEAD WITHOUT CONTRAST. COMPARISON: None. PROCEDURE: Sequential axial images of the head were performed without   intravenous contrast. Soft tissue and bone windows were examined. Images were   reformatted in the sagittal and coronal planes. .CT dose reduction was achieved   through use of a standardized protocol tailored for this examination and   automatic exposure control for dose modulation.         FINDINGS: The brain parenchyma and ventricular system are unremarkable in   appearance for age. There is no parenchymal mass or hemorrhage and no shift of   midline structures or extra-axial collection. No obvious acute ischemia. No bony   abnormality. Incidentally noted prosthetic right globe. Mucoperiosteal   thickening incidentally noted in the right maxillary sinus. Assessment and Plan :     Right lower extremity numbness with weakness: Resolved completely. Suspected TIA/CVA. CT head negative at this time, will consult neurology for further recommendations    Feeling of loss of fine sensations and feeling: Not sure about the etiology, will check S18 folic acid levels. We will follow-up with neurology recommendations. Elevated troponin: But patient is with chronic kidney disease. No further interventions at this time. Carotid atherosclerosis with a right vertebral artery stenosis at the origin. On statin already, we will consult vascular surgery for follow-up. Chronic kidney disease a stage V: On regular follow-up with nephrology clinic at [facility], on transplant list.  Recently had all the lab evaluations done seems to be stable. Anemia secondary to chronic kidney disease: Hemoglobin appears stable and acceptable levels. History of carcinoma prostate with surgery. But patient does have some symptoms of prostatic hypertrophy, will benefit evaluation with urology. Can be done as an outpatient. Admitted to medical telemetry for observation, full CODE STATUS, home medications reviewed and verified. Has spouse who will make decisions in case if he cannot but no advanced medical directives. No DVT prophylaxis is indicated at this time. CC : Marvel Bragg MD  Signed By: Deanna James MD     July 1, 2022      This dictation was done by dragon, computer voice recognition software. Often unanticipated grammatical, syntax, [place] phones and other interpretive errors are inadvertently transcribed.   Please excuse errors that have escaped final proofreading.

## 2022-07-02 VITALS
HEIGHT: 68 IN | TEMPERATURE: 97.9 F | RESPIRATION RATE: 18 BRPM | WEIGHT: 155 LBS | HEART RATE: 66 BPM | BODY MASS INDEX: 23.49 KG/M2 | OXYGEN SATURATION: 96 % | SYSTOLIC BLOOD PRESSURE: 106 MMHG | DIASTOLIC BLOOD PRESSURE: 67 MMHG

## 2022-07-02 LAB
ALBUMIN SERPL-MCNC: 3.2 G/DL (ref 3.5–5)
ANION GAP SERPL CALC-SCNC: 9 MMOL/L (ref 5–15)
APPEARANCE UR: CLEAR
ATRIAL RATE: 69 BPM
BACTERIA URNS QL MICRO: NEGATIVE /HPF
BILIRUB UR QL: NEGATIVE
BUN SERPL-MCNC: 54 MG/DL (ref 6–20)
BUN/CREAT SERPL: 13 (ref 12–20)
CA-I BLD-MCNC: 8.6 MG/DL (ref 8.5–10.1)
CA-I BLD-MCNC: 8.9 MG/DL (ref 8.5–10.1)
CALCULATED P AXIS, ECG09: 68 DEGREES
CALCULATED R AXIS, ECG10: 80 DEGREES
CALCULATED T AXIS, ECG11: -126 DEGREES
CHLORIDE SERPL-SCNC: 114 MMOL/L (ref 97–108)
CHOLEST SERPL-MCNC: 135 MG/DL
CO2 SERPL-SCNC: 17 MMOL/L (ref 21–32)
COLOR UR: ABNORMAL
CREAT SERPL-MCNC: 4.03 MG/DL (ref 0.7–1.3)
DIAGNOSIS, 93000: NORMAL
EST. AVERAGE GLUCOSE BLD GHB EST-MCNC: 105 MG/DL
FOLATE SERPL-MCNC: 16.2 NG/ML (ref 5–21)
GLUCOSE SERPL-MCNC: 84 MG/DL (ref 65–100)
GLUCOSE UR STRIP.AUTO-MCNC: NEGATIVE MG/DL
HBA1C MFR BLD: 5.3 % (ref 4–5.6)
HDLC SERPL-MCNC: 33 MG/DL
HDLC SERPL: 4.1 {RATIO} (ref 0–5)
HGB UR QL STRIP: NEGATIVE
KETONES UR QL STRIP.AUTO: NEGATIVE MG/DL
LDLC SERPL CALC-MCNC: 81.4 MG/DL (ref 0–100)
LEUKOCYTE ESTERASE UR QL STRIP.AUTO: NEGATIVE
LIPID PROFILE,FLP: NORMAL
NITRITE UR QL STRIP.AUTO: NEGATIVE
P-R INTERVAL, ECG05: 176 MS
PH UR STRIP: 7 [PH] (ref 5–8)
PHOSPHATE SERPL-MCNC: 3.6 MG/DL (ref 2.6–4.7)
POTASSIUM SERPL-SCNC: 4.3 MMOL/L (ref 3.5–5.1)
PROT UR STRIP-MCNC: 30 MG/DL
PSA SERPL-MCNC: 0 NG/ML (ref 0.01–4)
PTH-INTACT SERPL-MCNC: 324.7 PG/ML (ref 18.4–88)
Q-T INTERVAL, ECG07: 452 MS
QRS DURATION, ECG06: 98 MS
QTC CALCULATION (BEZET), ECG08: 484 MS
RBC #/AREA URNS HPF: ABNORMAL /HPF (ref 0–5)
SODIUM SERPL-SCNC: 140 MMOL/L (ref 136–145)
SP GR UR REFRACTOMETRY: 1.02 (ref 1–1.03)
TRIGL SERPL-MCNC: 103 MG/DL (ref ?–150)
TSH SERPL DL<=0.05 MIU/L-ACNC: 1.5 UIU/ML (ref 0.36–3.74)
UA: UC IF INDICATED,UAUC: ABNORMAL
UROBILINOGEN UR QL STRIP.AUTO: 0.1 EU/DL (ref 0.1–1)
VENTRICULAR RATE, ECG03: 69 BPM
VIT B12 SERPL-MCNC: 562 PG/ML (ref 193–986)
VLDLC SERPL CALC-MCNC: 20.6 MG/DL
WBC URNS QL MICRO: ABNORMAL /HPF (ref 0–4)

## 2022-07-02 PROCEDURE — 97530 THERAPEUTIC ACTIVITIES: CPT

## 2022-07-02 PROCEDURE — 97161 PT EVAL LOW COMPLEX 20 MIN: CPT

## 2022-07-02 PROCEDURE — 36415 COLL VENOUS BLD VENIPUNCTURE: CPT

## 2022-07-02 PROCEDURE — 97165 OT EVAL LOW COMPLEX 30 MIN: CPT

## 2022-07-02 PROCEDURE — 74011250637 HC RX REV CODE- 250/637: Performed by: HOSPITALIST

## 2022-07-02 PROCEDURE — 80061 LIPID PANEL: CPT

## 2022-07-02 PROCEDURE — 83036 HEMOGLOBIN GLYCOSYLATED A1C: CPT

## 2022-07-02 PROCEDURE — 84443 ASSAY THYROID STIM HORMONE: CPT

## 2022-07-02 PROCEDURE — G0378 HOSPITAL OBSERVATION PER HR: HCPCS

## 2022-07-02 PROCEDURE — 81001 URINALYSIS AUTO W/SCOPE: CPT

## 2022-07-02 PROCEDURE — 92610 EVALUATE SWALLOWING FUNCTION: CPT

## 2022-07-02 PROCEDURE — 82607 VITAMIN B-12: CPT

## 2022-07-02 PROCEDURE — 87086 URINE CULTURE/COLONY COUNT: CPT

## 2022-07-02 PROCEDURE — 80069 RENAL FUNCTION PANEL: CPT

## 2022-07-02 PROCEDURE — 84153 ASSAY OF PSA TOTAL: CPT

## 2022-07-02 RX ORDER — HYDRALAZINE HYDROCHLORIDE 50 MG/1
50 TABLET, FILM COATED ORAL 3 TIMES DAILY
Status: DISCONTINUED | OUTPATIENT
Start: 2022-07-02 | End: 2022-07-02 | Stop reason: HOSPADM

## 2022-07-02 RX ORDER — GUAIFENESIN 100 MG/5ML
325 LIQUID (ML) ORAL DAILY
Status: DISCONTINUED | OUTPATIENT
Start: 2022-07-03 | End: 2022-07-02 | Stop reason: HOSPADM

## 2022-07-02 RX ORDER — ATORVASTATIN CALCIUM 40 MG/1
80 TABLET, FILM COATED ORAL
Status: DISCONTINUED | OUTPATIENT
Start: 2022-07-02 | End: 2022-07-02 | Stop reason: HOSPADM

## 2022-07-02 RX ORDER — GUAIFENESIN 100 MG/5ML
81 LIQUID (ML) ORAL DAILY
Qty: 30 TABLET | Refills: 0 | Status: SHIPPED | OUTPATIENT
Start: 2022-07-03

## 2022-07-02 RX ADMIN — SODIUM BICARBONATE 650 MG: 650 TABLET ORAL at 10:03

## 2022-07-02 RX ADMIN — AMLODIPINE BESYLATE 10 MG: 5 TABLET ORAL at 10:03

## 2022-07-02 RX ADMIN — CALCITRIOL CAPSULES 0.25 MCG 0.25 MCG: 0.25 CAPSULE ORAL at 10:03

## 2022-07-02 NOTE — PROGRESS NOTES
PHYSICAL THERAPY EVALUATION  Patient: Maranda Garrido (72 y.o. male)  Date: 7/2/2022  Primary Diagnosis: TIA (transient ischemic attack) [G45.9]        Precautions: fall      ASSESSMENT  Pt is a 62 yo male admitted on 7/2//2022 for fall, gen weakness, numbness in left side. Being treated for TIA. .CT head (-)Patient reports no symptoms at this time PMH: As per H&P(patient await kidney transplant). Pt A&O x 4 . Please refer to flow sheet for home info. per patient report he was indep prior to adm including driving and is indep at this time with mobility. Based on the objective data described below, the patient presents with generalized weakness,. Pt sitting in the chair upon PT arrival,just finished up working with OT agreeable to PT evaluation. Pt indep sit <> stand transfers. Pt amb 250 feet with gt belt,  and sba/indep; demonstrating steady gt pattern with NO LOB noted. NO weakness observed Pt did good with session today with PT. Current PT DC recommendation home due to being at baseline. Other factors to consider for discharge: home self      PLAN :  Recommendations and Planned Interventions: D pt after eval  Recommend with staff: patient is safe to amb self    Frequency/Duration: Patient will be followed by physical therapy:  Eval and treat only    Recommendation for discharge: (in order for the patient to meet his/her long term goals)  [address]    This discharge recommendation:  Has been made in collaboration with the attending provider and/or case management    IF patient discharges home will need the following DME: none         SUBJECTIVE:   Patient stated I am better.     OBJECTIVE DATA SUMMARY:   HISTORY:    Past Medical History:   Diagnosis Date    Cancer ([facility] [address]     prostate cancer    Hypertension     Ill-defined condition     gout    Nausea & vomiting     after prostate biopsy    Other ill-defined conditions(799.89)     high cholesterol    Unspecified sleep apnea 2008    could not tolerate C-PAP     Past Surgical History:   Procedure Laterality Date    HX APPENDECTOMY      as a child    HX CHOLECYSTECTOMY  2004    HX OTHER SURGICAL      benign tumor left shoulder age 17    HX TONSILLECTOMY      as a child       Home Situation  Home Environment: Private residence  # Steps to Enter: 2  Rails to Enter: Yes  Hand Rails : Bilateral  One/Two Story Residence: Two story, live on 1st floor  Living Alone: No  Support Systems: Spouse/Significant Other  Patient Expects to be Discharged to[de-identified] Home  Current DME Used/Available at Home: Crutches,Walker, rolling,Wheelchair    EXAMINATION/PRESENTATION/DECISION MAKING:   Critical Behavior:  Neurologic State: Alert  Orientation Level: Oriented X4  Cognition: Follows commands,Appropriate decision making     Hearing: Auditory  Auditory Impairment: None    Range Of Motion:  AROM: Within functional limits                       Strength:    Strength: Within functional limits                    Tone & Sensation:                  Sensation: Impaired (R distal fingertips numb at baseline)               Coordination:  Coordination: Within functional limits  Vision:   Acuity:  (Per pt, poor vision R eye at baseline)  Functional Mobility:  Bed Mobility:  Rolling: Independent  Supine to Sit: Independent  Sit to Supine: Independent  Scooting: Independent  Transfers:  Sit to Stand: Independent  Stand to Sit: Independent  Stand Pivot Transfers: Independent     Bed to Chair: Independent              Balance:   Sitting: Intact; Without support  Standing: Intact; Without support  Ambulation/Gait Training:  Distance (ft): 250 Feet (ft)  Assistive Device: Gait belt  Ambulation - Level of Assistance: Independent     Gait Description (WDL): Exceptions to [facility]                                         Functional Measure:  Laura Yarbrough AM-PAC 6 Clicks         Basic Mobility Inpatient Short Form  How much difficulty does the patient currently have. .. Unable A Lot A Little None   1. Turning over in bed (including adjusting bedclothes, sheets and blankets)? [] 1   [] 2   [] 3   [x] 4   2. Sitting down on and standing up from a chair with arms ( e.g., wheelchair, bedside commode, etc.)   [] 1   [] 2   [] 3   [x] 4   3. Moving from lying on back to sitting on the side of the bed? [] 1   [] 2   [] 3   [x] 4          How much help from another person does the patient currently need. .. Total A Lot A Little None   4. Moving to and from a bed to a chair (including a wheelchair)? [] 1   [] 2   [] 3   [x] 4   5. Need to walk in hospital room? [] 1   [] 2   [] 3   [x] 4   6. Climbing 3-5 steps with a railing? [] 1   [] 2   [] 3   [x] 4   © , Trustees of [facility], under license to [employer]. All rights reserved     Score:  Initial:  Most Recent: X (Date: 2022 )   Interpretation of Tool:  Represents activities that are increasingly more difficult (i.e. Bed mobility, Transfers, Gait). Score 24 23 22-20 19-15 14-10 9-7 6   Modifier CH CI CJ CK CL CM CN         Physical Therapy Evaluation Charge Determination   History Examination Presentation Decision-Making   LOW Complexity : Zero comorbidities / personal factors that will impact the outcome / POC LOW Complexity : 1-2 Standardized tests and measures addressing body structure, function, activity limitation and / or participation in recreation  LOW Complexity : Stable, uncomplicated  Other outcome measures [facility] 6        Based on the above components, the patient evaluation is determined to be of the following complexity level: LOW     Pain Ratin/10    Activity Tolerance:   Good    After treatment patient left in no apparent distress:   Sitting in chair and Call bell within reach. GOALS:  No goals, patient is at baseline. COMMUNICATION/EDUCATION:   The patients plan of care was discussed with: Occupational therapist and Registered nurse.      Fall prevention education was provided and the patient/caregiver indicated understanding.          Thank you for this referral.  Brit Kelly, PT   Time Calculation: 21 mins

## 2022-07-02 NOTE — PROGRESS NOTES
Spiritual Care Assessment/Progress Note  [facility]      NAME: Liz Massey      MRN: [id number]  AGE: 61 y.o.  SEX: male  Jain Affiliation: No Episcopalian   Language: English     7/2/2022     Total Time (in minutes): 30     Spiritual Assessment begun in [facility] through conversation with:         [x]Patient        [] Family    [] Friend(s)        Reason for Consult: Initial/Spiritual assessment, patient floor,Advance medical directive consult     Spiritual beliefs: (Please include comment if needed)     [] Identifies with a gary tradition:         [] Supported by a gary community:            [] Claims no spiritual orientation:           [] Seeking spiritual identity:                [x] Adheres to an individual form of spirituality:           [] Not able to assess:                           Identified resources for coping:      [x] Prayer                               [] Music                  [] Guided Imagery     [x] Family/friends                 [] Pet visits     [] Devotional reading                         [] Unknown     [] Other:                                               Interventions offered during this visit: (See comments for more details)    Patient Interventions: Affirmation of emotions/emotional suffering,Affirmation of gary,Advance medical directive consult,Catharsis/review of pertinent events in supportive environment,Coping skills reviewed/reinforced,Guidance concerning next steps/process to be expected,Iconic (affirming the presence of God/Higher Power),Initial/Spiritual assessment, patient floor,Normalization of emotional/spiritual concerns,Prayer (assurance of),Jain beliefs/image of God discussed           Plan of Care:     [] Support spiritual and/or cultural needs    [x] Support AMD and/or advance care planning process      [] Support grieving process   [] Coordinate Rites and/or Rituals    [] Coordination with community clergy   [] No spiritual needs identified at this time   [] Detailed Plan of Care below (See Comments)  [] Make referral to Music Therapy  [] Make referral to Pet Therapy     [] Make referral to Addiction services  [] Make referral to [facility]  [] Make referral to Spiritual Care Partner  [] No future visits requested        [x] Contact Spiritual Care for further referrals     Comments: The purpose of the visit was to do a spiritual assessment and an advance directive on the patient. The patient mentioned that he has a supportive and loving wife, who he trust as his medical decision maker. He expressed feeling certain his wife would respectfully carry out his wishes. He shared that he does find strength and hope in his relationship with God. He mentioned that he does not appreciate feeling regulated to be religous. The  listened empathically, explored resources, spiritual meaningfulness, encouraged ongoing supportive, answered questions regarding advance directive, and provided the ministry of presence. [address] [facility] [address]    can be reached by calling the  at [facility]  [phone]

## 2022-07-02 NOTE — PROGRESS NOTES
SPEECH LANGUAGE PATHOLOGY BEDSIDE SWALLOW EVALUATIONS  Patient: Russ Connell  (61 y.o. )  Date: 7/2/2022  Primary Diagnosis: <principal problem not specified>   Precautions:  aspiration/GERD    ASSESSMENT :  Patient's oropharyngeal swallow fxn appears WFL based on the objective data described below. Nsg reports patient tolerating regular/thin diet without s/sx asp/pen. Patient alert, oriented x4. Patient maintains reciprocal conversation, provides relevant medical hx, responds to questions, and follows commands. Patient's 2 family members present with patient's consent. Patient agreeable to bedside swallow evaluation, repositioned HOB upright prior to po. Dietary entered room briefly during evaluation for lunch/dinner orders; patient independently stated preferences. Patient participates in trials thin and hard solids. Oral phase c/b adequate mastication and manipulation, adequate bolus control, a-p transit timely. Pharyngeal phase c/b swallow initiation timely; HLE adequate upon palpation. No clinical indicators of aspiration or penetration observed. Patient will benefit from skilled intervention to address the above impairments. Patients rehabilitation potential is considered to be Excellent. PLAN :  Recommendations and Planned Interventions:  Patient appears appropriate to continue with regular diet and thin liquids. Aspiration and GERD precautions advised. HOB elevation 90 degrees during and for at least 60 minutes after meals, slow rate of intake, small sips and bites, alternate solids/liquids, add slick textures to diet; avoid caffeine, carbonated beverages, greasy foods, acidic foods and beverages. GI consult if/as indicated. Frequency/Duration: Patient will be followed by speech-language pathology 1 time a week to address goals. Discharge Recommendations:  To Be Determined     SUBJECTIVE:   Patient reports no hx dysphagia or difficulty with swallowing currently, reports hx hiatal hernia, occasional GERD symptoms. Patient and family asking for timeline for neurology consult; nsg aware. OBJECTIVE:     Past Medical History:   Diagnosis Date    Cancer [facility])     prostate cancer    Hypertension     Ill-defined condition     gout    Nausea & vomiting     after prostate biopsy    Other ill-defined conditions(799.89)     high cholesterol    Unspecified sleep apnea 2008    could not tolerate C-PAP       CXR Results  (Last 48 hours)                 07/01/22 1455  XR CHEST PORT Final result    Impression:  Interstitial pattern which is new.:       Narrative:  Clinical indication: CVA. Portable AP supine portable view of the chest obtained, comparison October 2014. There is a mild diffuse interstitial edema pattern worse on the left perihilar   region. Nonspecific. The  heart size is normal. No consolidation. Diet prior to admission: regular/thin per patient and family  Current Diet:  DIET ADULT reg/thin    Cognitive and Communication Status:  Neurologic State: Alert,Appropriate for age,Eyes open spontaneously  Orientation Level: Oriented X4  Cognition: Appropriate for age attention/concentration,Appropriate safety awareness,Follows commands                               After treatment:   Patient left in no apparent distress in bed, Call bell within reach, Nursing notified, and Caregiver / family present    COMMUNICATION/EDUCATION:   Patient was educated regarding dysphagia, safe swallow precautions, diet recs, and ST POC. He demonstrated Excellent understanding as evidenced by engagement, acknowledged recs. The patient's plan of care including recommendations, planned interventions, and recommended diet changes were discussed with: Registered nurse. Patient/family agree to work toward stated goals and plan of care.     Thank you for this referral.  Coy Rice M.S., CCC-SLP        Problem: Dysphagia (Adult)  Goal: *Acute Goals and Plan of Care (Insert Text)  Description: Speech Therapy Swallow Goals  Initiated 7/2/2022  -Patient stated goal: eat and drink  -Patient will tolerate regular diet with thin liquids without clinical indicators of aspiration given minimal cues within 7 day(s). -Patient will tolerate PO trials without clinical indicators of aspiration given minimal cues within 7 day(s). -Patient will demonstrate understanding of swallow safety precautions and aspiration precautions, diet recs with minimal cues within 7  day(s).           Outcome: Not Met

## 2022-07-02 NOTE — ACP (ADVANCE CARE PLANNING)
Advance Care Planning   Advance Care Planning Inpatient Note  [facility]    Today's Date: 7/2/2022  Unit: [facility]G    Received request from admission screening. Upon review of chart and communication with care team, patient's decision making abilities are not in question. Patient was/were present in the room during visit. Goals of ACP Conversation:  Discuss Advance Care planning documents    Health Care Decision Makers:    No healthcare decision makers have been documented. Click here to complete [address] including selection of the Healthcare Decision Maker Relationship (ie \"Primary\")    Summary:  [place]  Documented Next of Kin, per patient report    Nandini 53 (Patient Wishes) on file:  None     Assessment:    The purpose of the visit was to do a spiritual assessment and an advance directive on the patient. The patient mentioned that he has a supportive and loving wife, who he trust as his medical decision maker. He expressed feeling certain his wife would respectfully carry out his wishes. He shared that he does find strength and hope in his relationship with God. He mentioned that he does not appreciate feeling regulated to be religous. The  listened empathically, explored resources, spiritual meaningfulness, encouraged ongoing supportive, answered questions regarding advance directive, and provided the ministry of presence.       Interventions:  Requested patient/family submit existing document(s) for our records: [address] of /Advance Directive appointment of Health care agent  Provided education on documents for clarity and greater understanding  Discussed and provided education on state decision maker hierarchy  Encouraged ongoing ACP conversation with future decision makers and loved ones  Reviewed but did not complete ACP document    Care Preferences Communicated:  No    Outcomes/Plan:  ACP Discussion Postponed    Chaplain Wilman on 7/2/2022 at 1:01 PM

## 2022-07-02 NOTE — PROGRESS NOTES
Medicare Outpatient Observation Notice (MOON)/ [place] Outpatient Observation Notice (Daisy Jennings) provided to patient/representative with verbal explanation of the notice. Time allotted for questions regarding the notice. Patient /representative provided a completed copy of the MOON/VOON notice. Copy placed on bedside chart.         Sathish Madrigal, MYLENE

## 2022-07-02 NOTE — PROGRESS NOTES
Patient received AVS, patient educated on discharge instruction per AVS instructions. Patient questions answered, patient has no complaints at this time.

## 2022-07-02 NOTE — PROGRESS NOTES
OCCUPATIONAL THERAPY EVALUATION/DISCHARGE  Patient: Peng Jean (89 y.o. male)  Date: 7/2/2022  Primary Diagnosis: TIA (transient ischemic attack) [G45.9]       Precautions: standard       ASSESSMENT  Pt is a 60 y/o M with PMH of HTN, gouty arthritis, CKD on transplant list and follows with [facility] nephrology clinic presenting to [facility] with c/o generalized weakness with fall and numbness, admitted 7/1/22 and being treated for TIA. CT head (-). Pt received semi-supine in bed upon arrival, AXO x4, and agreeable to OT evaluation at this time. Per pt report, pt lives with spouse in a two-story home (stays on the first floor) with 2 HUY and B HR, was IND with ADLs, driving, and ambulatory without AD at [facility]. DME owned: RW, crutches, w/c. Pt denies any other fall hx other than the one that brought him in. Based on current observations, pt presents at functional baseline for ADLs/mobility demonstrating good UE AROM/strength, coordination, static/dynamic sitting and standing balance and functional activity tolerance during evaluation today. Pt currently IND with bed mobility, IND donning socks seated EOB, and IND sit><stand transfers to/from EOB, toilet and chair; IND bathroom mobility with no LOB noted. Pt completed basic grooming activities (washing face and oral care) s/p setup for retrieval only standing sink side, IND with good coordination and AROM observed. Overall, pt tolerates session well with no c/o dizziness, SOB, vision changes, AROM/strength or coordination deficits at this time. Pt is reported and observed to be at functional baseline with ADLs/mobility with no further skilled OT services indicated. Pt in agreement. Will therefore d/c pt from OT caseload at this time. Recommend d/c home with family/self care once medically appropriate. Other factors to consider for discharge: IND at baseline     Patient will benefit from skilled therapy intervention to address the above noted impairments.        PLAN :  Recommendation for discharge: (in order for the patient to meet his/her long term goals)  [address]    This discharge recommendation:  Has been made in collaboration with the attending provider and/or case management    IF patient discharges home will need the following DME: none       SUBJECTIVE:   Patient stated I feel a lot better.     OBJECTIVE DATA SUMMARY:   HISTORY:   Past Medical History:   Diagnosis Date    Cancer ([facility] [address]     prostate cancer    Hypertension     Ill-defined condition     gout    Nausea & vomiting     after prostate biopsy    Other ill-defined conditions(799.89)     high cholesterol    Unspecified sleep apnea 2008    could not tolerate C-PAP     Past Surgical History:   Procedure Laterality Date    HX APPENDECTOMY      as a child    HX CHOLECYSTECTOMY  2004    HX OTHER SURGICAL      benign tumor left shoulder age 17   [de-identified] TONSILLECTOMY      as a child       Expanded or extensive additional review of patient history:     Home Situation  Home Environment: Private residence  # Steps to Enter: 2  Rails to Enter: Yes  Hand Rails : Bilateral  One/Two Story Residence: Two story, live on 1st floor  Living Alone: No  Support Systems: Spouse/Significant Other  Patient Expects to be Discharged to[de-identified] Home  Current DME Used/Available at Home: Crutches,Walker, rolling,Wheelchair    Hand dominance: Right    EXAMINATION OF PERFORMANCE DEFICITS:  Cognitive/Behavioral Status:  Neurologic State: Alert  Orientation Level: Oriented X4  Cognition: Follows commands; Appropriate decision making               Hearing: Auditory  Auditory Impairment: None    Vision/Perceptual:                           Acuity:  (Per pt, poor vision R eye at baseline)     Denies any acute visual changes    Range of Motion:  AROM: Within functional limits                         Strength:  Strength:  Within functional limits                Coordination:  Coordination: Within functional limits  Fine Motor Skills-Upper: Left Intact; Right Intact    Gross Motor Skills-Upper: Left Intact; Right Intact    Tone & Sensation:  Sensation: Impaired (R distal fingertips numb at baseline)                      Balance:  Sitting: Intact; Without support  Standing: Intact; Without support    Functional Mobility and Transfers for ADLs:  Bed Mobility:  Rolling: Independent  Supine to Sit: Independent  Sit to Supine: Independent  Scooting: Independent    Transfers:  Sit to Stand: Independent  Stand to Sit: Independent  Bed to Chair: Independent  Bathroom Mobility: Independent  Toilet Transfer : Independent    ADL Intervention and task modifications:       Grooming  Grooming Assistance: Independent  Position Performed: Standing  Washing Face: Independent  Brushing Teeth: Independent                   Lower Body Dressing Assistance  Socks: Independent  Leg Crossed Method Used: Yes  Position Performed: Seated edge of bed              Therapeutic Exercise:  No UE HEP needed at this time. Functional Measure:    [facility] AM-PACTM \"6 Clicks\"                                                       Daily Activity Inpatient Short Form  How much help from another person does the patient currently need. .. Total; A Lot A Little None   1. Putting on and taking off regular lower body clothing? []  1 []  2 []  3 [x]  4   2. Bathing (including washing, rinsing, drying)? []  1 []  2 []  3 [x]  4   3. Toileting, which includes using toilet, bedpan or urinal? [] 1 []  2 []  3 [x]  4   4. Putting on and taking off regular upper body clothing? []  1 []  2 []  3 [x]  4   5. Taking care of personal grooming such as brushing teeth? []  1 []  2 []  3 [x]  4   6. Eating meals? []  1 []  2 []  3 [x]  4   © 2007, Trustees of [facility], under license to [employer]. All rights reserved     Score: 24/24     Interpretation of Tool:  Represents clinically-significant functional categories (i.e. Activities of daily living).   Percentage of Impairment CH    0% CI    1-19% CJ    20-39% CK    40-59% CL    60-79% CM    80-99% CN     100%   [facility]  Score 6-24 24 23 20-22 15-19 10-14 7-9 6         Occupational Therapy Evaluation Charge Determination   History Examination Decision-Making   LOW Complexity : Brief history review  LOW Complexity : 1-3 performance deficits relating to physical, cognitive , or psychosocial skils that result in activity limitations and / or participation restrictions  LOW Complexity : No comorbidities that affect functional and no verbal or physical assistance needed to complete eval tasks       Based on the above components, the patient evaluation is determined to be of the following complexity level: LOW     Pain Ratin/10    Activity Tolerance: WNL    After treatment patient left in no apparent distress:    Sitting in chair and Call bell within reach    COMMUNICATION/EDUCATION:   The patients plan of care was discussed with: Physical therapist and Registered nurse.      Thank you for this referral.  Kym Mock  Time Calculation: 26 mins

## 2022-07-02 NOTE — DISCHARGE SUMMARY
Hospitalist Discharge Summary     Patient ID:    Alexander Diane  [id number]  61 y.o.  1955    Admit date: 7/1/2022    Discharge date : 7/2/2022      Final Diagnoses:     TIA vs Syncope  Carotid Disease  CKD5  Polycystic Kidney Disease  HTN    Reason for Hospitalization:  61 y.o. male with history of hypertension, gouty arthritis, chronic kidney disease on transplant list and regular follow-up with [facility] nephrology clinic  presents to the emergency room after a fall. Patient states that he felt so weak and fell on the floor, did not lose any consciousness. Denies any chest pain, palpitations, nausea or vomiting. Denies any headache. He cannot explain but overall in general he feels very weak. States when he is touching himself feels like somebody else is touching him, feels like fine sensation is lost.  He cannot make a difference in the sharp pain and a touch of soft thing. It is mostly on his right foot today, which extended into the leg. Hospital Course:   Patient observed in the hospital. No recurrent symptoms or focal deficits noted. Evaluated by Neuro/Dr. Sudhakar Talley. Patient has had similar episode many years ago. He is being evaluated for Renal Transplant by [facility]. Tele Sinus. Evaluated by PT/OT. CTA Neck/Head: mild-moderate ASCD     Continue Statin. Low dose ASA added but recommended f/up with [facility] Transplant team re: aspirin and get referral to [facility] vascular service to assess Carotid process. He understood and acknowledged. Discharge Medications:   Current Discharge Medication List      START taking these medications    Details   aspirin 81 mg chewable tablet Take 1 Tablet by mouth daily. Qty: 30 Tablet, Refills: 0  Start date: 7/3/2022         CONTINUE these medications which have NOT CHANGED    Details   albuterol (PROVENTIL VENTOLIN) 2.5 mg /3 mL (0.083 %) nebu 2.5 mg by Nebulization route every six (6) hours as needed for Wheezing.       amLODIPine (NORVASC) 10 mg tablet Take 5 mg by mouth daily. fluticasone propionate (FLONASE) 50 mcg/actuation nasal spray 2 Sprays by Both Nostrils route daily as needed. calcitRIOL (ROCALTROL) 0.25 mcg capsule Take 0.25 mcg by mouth daily. metoprolol succinate (TOPROL-XL) 100 mg tablet Take 100 mg by mouth daily. sodium bicarbonate 650 mg tablet Take 650 mg by mouth two (2) times a day. hydrALAZINE (APRESOLINE) 50 mg tablet Take 50 mg by mouth three (3) times daily. Indications: high blood pressure      allopurinol (ZYLOPRIM) 300 mg tablet Take 300 mg by mouth every evening. simvastatin (ZOCOR) 40 mg tablet Take 40 mg by mouth nightly. Follow up Care:    1. James Ricardo MD in 1-2 weeks. Follow-up Information     Follow up With Specialties Details Why Contact Info    James Ricardo MD Internal Medicine Physician   [address] [place] [id number] [phone]              Patient Follow Up Instructions: Activity: Activity as tolerated  Diet:  Renal Diet    Condition at Discharge:  Stable  __________________________________________________________________    Disposition  Home or Self Care  ____________________________________________________________________    Code Status:  Full Code  ___________________________________________________________________    Discharge Exam:  Patient seen and examined by me on discharge day. Pertinent Findings:  Gen:    Not in distress  Chest: Clear lungs  CVS:   Regular rhythm.   No edema  Abd:  Soft, not distended, not tender  Neuro:  Alert    CONSULTATIONS: Neurology    Significant Diagnostic Studies:   Recent Results (from the past 24 hour(s))   LIPID PANEL    Collection Time: 07/02/22  6:21 AM   Result Value Ref Range    LIPID PROFILE        Cholesterol, total 135 <200 mg/dL    Triglyceride 103 <150 mg/dL    HDL Cholesterol 33 mg/dL    LDL, calculated 81.4 0 - 100 mg/dL    VLDL, calculated 20.6 mg/dL    CHOL/HDL Ratio 4.1 0.0 - 5.0     HEMOGLOBIN A1C WITH EAG Collection Time: 07/02/22  6:21 AM   Result Value Ref Range    Hemoglobin A1c 5.3 4.0 - 5.6 %    Est. average glucose 105 mg/dL   TSH 3RD GENERATION    Collection Time: 07/02/22  6:21 AM   Result Value Ref Range    TSH 1.50 0.36 - 3.74 uIU/mL   VITAMIN B12 & FOLATE    Collection Time: 07/02/22  6:21 AM   Result Value Ref Range    Vitamin B12 562 193 - 986 pg/mL    Folate 16.2 5.0 - 21.0 ng/mL   RENAL FUNCTION PANEL    Collection Time: 07/02/22  6:21 AM   Result Value Ref Range    Sodium 140 136 - 145 mmol/L    Potassium 4.3 3.5 - 5.1 mmol/L    Chloride 114 (H) 97 - 108 mmol/L    CO2 17 (L) 21 - 32 mmol/L    Anion gap 9 5 - 15 mmol/L    Glucose 84 65 - 100 mg/dL    BUN 54 (H) 6 - 20 mg/dL    Creatinine 4.03 (H) 0.70 - 1.30 mg/dL    BUN/Creatinine ratio 13 12 - 20      GFR est AA 18 (L) >60 ml/min/1.73m2    GFR est non-AA 15 (L) >60 ml/min/1.73m2    Calcium 8.6 8.5 - 10.1 mg/dL    Phosphorus 3.6 2.6 - 4.7 mg/dL    Albumin 3.2 (L) 3.5 - 5.0 g/dL   PSA, DIAGNOSTIC (PROSTATE SPECIFIC AG)    Collection Time: 07/02/22  6:21 AM   Result Value Ref Range    Prostate Specific Ag 0.0 (L) 0.01 - 4.0 ng/mL   URINALYSIS W/ REFLEX CULTURE    Collection Time: 07/02/22  9:45 AM    Specimen: Urine   Result Value Ref Range    Color Yellow/Straw      Appearance Clear Clear      Specific gravity 1.018 1.003 - 1.030      pH (UA) 7.0 5.0 - 8.0      Protein 30 (A) Negative mg/dL    Glucose Negative Negative mg/dL    Ketone Negative Negative mg/dL    Bilirubin Negative Negative      Blood Negative Negative      Urobilinogen 0.1 0.1 - 1.0 EU/dL    Nitrites Negative Negative      Leukocyte Esterase Negative Negative      UA:UC IF INDICATED Urine Culture Ordered (A) Culture not indicated by UA result      WBC 0-5 0 - 4 /hpf    RBC 0-5 0 - 5 /hpf    Bacteria Negative Negative /hpf     CTA CODE NEURO HEAD AND NECK W CONT   Final Result   CTA Head:   Evaluation in the region of the [place] is severely limited by motion.    1. Occlusion of the distal right V4 segment, with nondominant right vertebral   artery. 2. Otherwise no evidence of flow-limiting stenosis or aneurysm. CTA Neck:   1. Severe stenosis at the origin of the nondominant right vertebral artery. 2. Approximately 55% stenosis of the proximal right internal carotid artery by   NASCET criteria. 3. Mild stenosis (less than 50% by NASCET criteria) of the proximal left   internal carotid artery. 4. Additional atherosclerotic disease as above. XR CHEST PORT   Final Result   Interstitial pattern which is new.:      CT CODE NEURO HEAD WO CONTRAST   Final Result   No acute intracranial abnormality.                  Signed:  Pratibha Joshua MD  7/2/2022  4:02 PM

## 2022-07-02 NOTE — CONSULTS
NEUROLOGY CONSULT    Name Karie Cancino Age 61 y.o. MRN [id number]  1955     Referring Physician: Reese Guerrero MD    Chief Complaint: TIA     This is a 61 y.o. right handed  male who was admitted through the ED where he was brought in by the EMS after he had a fall in a store yesterday morning. According to patient he had couple of watery diarrheas the day before and in the morning he ate some fruit, drove to the store and was walking around to collect the stuff from the [place]. He was in one of the house when he felt numbness in the right foot and right after he felt weak all over and could not stay on his feet and fell off. He did not lose consciousness but was sweating and clammy and could not get up at least for 15 to 20 minutes before he could be found on the floor by another customer. EMS was called and he was transported to the ED where he was treated with IV fluids boluses and he felt much better. His speech was slurred and low volume, but he denied any focal weakness or facial droop. At the time of my evaluation today he was feeling much better with no weakness or dizziness or any other symptoms. Assessment and Plan:  1. Near syncope: Secondary to orthostatic hypotension. He has stage IV/V chronic kidney disease on transplant list who had 2 watery diarrheas a day before and had not eaten much that morning other than some fruits. He was walking around in the store when this happened. The etiology is orthostatic hypotension causing generalized weakness and fall. I doubt I will call a TIA or stroke. He had a negative CT scan. No further neurological work-up is indicated and he can be discharged from neurology standpoint. 2.  Chronic kidney disease a stage IV/V on transplant list.  3.  Hypertension  4. Hyperlipidemia  5. Obstructive sleep apnea, supposed to be on CPAP but has difficulty tolerating it.   6.  Prostate cancer    Thank you for the consult    No Known Allergies Current Facility-Administered Medications   Medication Dose Route Frequency    hydrALAZINE (APRESOLINE) tablet 50 mg  50 mg Oral TID    atorvastatin (LIPITOR) tablet 80 mg  80 mg Oral QHS    [START ON 7/3/2022] aspirin chewable tablet 324 mg  324 mg Oral DAILY    albuterol (PROVENTIL VENTOLIN) nebulizer solution 2.5 mg  2.5 mg Nebulization Q6H PRN    amLODIPine (NORVASC) tablet 10 mg  10 mg Oral DAILY    fluticasone propionate (FLONASE) 50 mcg/actuation nasal spray 2 Spray  2 Spray Both Nostrils DAILY    calcitRIOL (ROCALTROL) capsule 0.25 mcg  0.25 mcg Oral DAILY    metoprolol succinate (TOPROL-XL) XL tablet 100 mg  100 mg Oral DAILY    sodium bicarbonate tablet 650 mg  650 mg Oral TID    acetaminophen (TYLENOL) tablet 650 mg  650 mg Oral Q4H PRN    Or    acetaminophen (TYLENOL) solution 650 mg  650 mg Per NG tube Q4H PRN    Or    acetaminophen (TYLENOL) suppository 650 mg  650 mg Rectal Q4H PRN    allopurinoL (ZYLOPRIM) tablet 200 mg  200 mg Oral DAILY     Past Medical History:   Diagnosis Date    Cancer ([facility] [address]     prostate cancer    Hypertension     Ill-defined condition     gout    Nausea & vomiting     after prostate biopsy    Other ill-defined conditions(799.89)     high cholesterol    Unspecified sleep apnea 2008    could not tolerate C-PAP     Social History     Tobacco Use    Smoking status: Never Smoker    Smokeless tobacco: Never Used   Substance Use Topics    Alcohol use: Yes     Alcohol/week: 1.7 standard drinks     Types: 2 Cans of beer per week    Drug use: No     Exam  Visit Vitals  /67   Pulse 66   Temp 97.9 °F (36.6 °C)   Resp 18   Ht 5' 8\" (1.727 m)   Wt 70.3 kg (155 lb)   SpO2 96%   BMI 23.57 kg/m²     General: Well developed, well nourished. Patient in no distress   Head: Normocephalic, atraumatic, anicteric sclera   Neck Normal ROM, No thyromegally   Lungs:  Clear to auscultation    Cardiac: Regular rate and rhythm with no murmurs.    Abd: Bowel sounds were audible   Ext: No pedal edema   Skin: Supple no rash     NeurologicExam:  Mental Status: Alert and oriented to person place and time   Speech: Fluent no aphasia or dysarthria. Cranial Nerves:   Pupils are equal round and reactive to light and accommodation, extraocular movements are intact and full, visual fields are intact by confrontation, no nystagmus noted, face is symmetric, sensation in face is intact and symmetric, hearing is intact and symmetric, tongue and uvula are in midline with normal movements, palate is elevating equally, shoulder shrug is intact and symmetric. Motor:  Full and symmetric strength of upper and lower ext . Normal bulk and tone. Reflexes:   Deep tendon reflexes 2/4 and symmetric. Sensory:   Symmetric and intact    Gait:  Gait is deferred   Tremor:   No tremor noted. Cerebellar:  Coordination intact for finger-nose-finger. Neurovascular: No carotid bruits.  No JVD      Lab Review  Lab Results   Component Value Date/Time    WBC 9.5 07/01/2022 02:31 PM    HCT 33.6 (L) 07/01/2022 02:31 PM    HGB 11.1 (L) 07/01/2022 02:31 PM    PLATELET 964 59/47/5596 02:31 PM     Lab Results   Component Value Date/Time    Sodium 140 07/02/2022 06:21 AM    Potassium 4.3 07/02/2022 06:21 AM    Chloride 114 (H) 07/02/2022 06:21 AM    CO2 17 (L) 07/02/2022 06:21 AM    Glucose 84 07/02/2022 06:21 AM    BUN 54 (H) 07/02/2022 06:21 AM    Creatinine 4.03 (H) 07/02/2022 06:21 AM    Calcium 8.6 07/02/2022 06:21 AM     Lab Results   Component Value Date/Time    Vitamin B12 562 07/02/2022 06:21 AM    Folate 16.2 07/02/2022 06:21 AM     Lab Results   Component Value Date/Time    LDL, calculated 81.4 07/02/2022 06:21 AM     Lab Results   Component Value Date/Time    Hemoglobin A1c 5.3 07/02/2022 06:21 AM     No components found for: TROPQUANT  No results found for: KAE

## 2022-07-02 NOTE — PROGRESS NOTES
Problem: Falls - Risk of  Goal: *Absence of Falls  Description: Document [place] Fall Risk and appropriate interventions in the flowsheet.   Outcome: Progressing Towards Goal  Note: Fall Risk Interventions:  Mobility Interventions: Bed/chair exit alarm,Patient to call before getting OOB                   History of Falls Interventions: Bed/chair exit alarm         Problem: TIA/CVA Stroke: 0-24 hours  Goal: Diagnostic Test/Procedures  Outcome: Progressing Towards Goal  Goal: Nutrition/Diet  Outcome: Progressing Towards Goal  Goal: Respiratory  Outcome: Progressing Towards Goal  Goal: Treatments/Interventions/Procedures  Outcome: Progressing Towards Goal  Goal: Psychosocial  Outcome: Progressing Towards Goal  Goal: *Neurologically stable  Description: Absence of additional neurological deficits    Outcome: Progressing Towards Goal

## 2022-07-03 LAB
BACTERIA SPEC CULT: NORMAL
SPECIAL REQUESTS,SREQ: NORMAL

## 2022-08-18 ENCOUNTER — TRANSCRIBE ORDER (OUTPATIENT)
Dept: REGISTRATION | Age: 67
End: 2022-08-18